# Patient Record
Sex: FEMALE | Race: ASIAN
[De-identification: names, ages, dates, MRNs, and addresses within clinical notes are randomized per-mention and may not be internally consistent; named-entity substitution may affect disease eponyms.]

---

## 2022-05-10 PROBLEM — Z00.00 ENCOUNTER FOR PREVENTIVE HEALTH EXAMINATION: Status: ACTIVE | Noted: 2022-05-10

## 2022-06-13 ENCOUNTER — ASOB RESULT (OUTPATIENT)
Age: 27
End: 2022-06-13

## 2022-06-13 ENCOUNTER — APPOINTMENT (OUTPATIENT)
Dept: ANTEPARTUM | Facility: CLINIC | Age: 27
End: 2022-06-13
Payer: COMMERCIAL

## 2022-06-13 PROCEDURE — 76805 OB US >/= 14 WKS SNGL FETUS: CPT

## 2022-07-07 ENCOUNTER — ASOB RESULT (OUTPATIENT)
Age: 27
End: 2022-07-07

## 2022-07-07 ENCOUNTER — APPOINTMENT (OUTPATIENT)
Dept: ANTEPARTUM | Facility: CLINIC | Age: 27
End: 2022-07-07

## 2022-07-07 PROCEDURE — 76815 OB US LIMITED FETUS(S): CPT

## 2022-09-01 ENCOUNTER — ASOB RESULT (OUTPATIENT)
Age: 27
End: 2022-09-01

## 2022-09-01 ENCOUNTER — APPOINTMENT (OUTPATIENT)
Dept: ANTEPARTUM | Facility: CLINIC | Age: 27
End: 2022-09-01

## 2022-09-01 PROCEDURE — 76816 OB US FOLLOW-UP PER FETUS: CPT

## 2022-09-01 PROCEDURE — 76819 FETAL BIOPHYS PROFIL W/O NST: CPT

## 2022-10-18 ENCOUNTER — OUTPATIENT (OUTPATIENT)
Dept: OUTPATIENT SERVICES | Facility: HOSPITAL | Age: 27
LOS: 1 days | End: 2022-10-18
Payer: COMMERCIAL

## 2022-10-18 DIAGNOSIS — O34.211 MATERNAL CARE FOR LOW TRANSVERSE SCAR FROM PREVIOUS CESAREAN DELIVERY: ICD-10-CM

## 2022-10-18 LAB
BASOPHILS # BLD AUTO: 0 K/UL — SIGNIFICANT CHANGE UP (ref 0–0.2)
BASOPHILS NFR BLD AUTO: 0 % — SIGNIFICANT CHANGE UP (ref 0–2)
EOSINOPHIL # BLD AUTO: 0 K/UL — SIGNIFICANT CHANGE UP (ref 0–0.5)
EOSINOPHIL NFR BLD AUTO: 0 % — SIGNIFICANT CHANGE UP (ref 0–6)
HCT VFR BLD CALC: 32.8 % — LOW (ref 34.5–45)
HGB BLD-MCNC: 10.4 G/DL — LOW (ref 11.5–15.5)
LYMPHOCYTES # BLD AUTO: 2.79 K/UL — SIGNIFICANT CHANGE UP (ref 1–3.3)
LYMPHOCYTES # BLD AUTO: 23 % — SIGNIFICANT CHANGE UP (ref 13–44)
MCHC RBC-ENTMCNC: 26.9 PG — LOW (ref 27–34)
MCHC RBC-ENTMCNC: 31.7 GM/DL — LOW (ref 32–36)
MCV RBC AUTO: 84.8 FL — SIGNIFICANT CHANGE UP (ref 80–100)
MONOCYTES # BLD AUTO: 1.58 K/UL — HIGH (ref 0–0.9)
MONOCYTES NFR BLD AUTO: 13 % — SIGNIFICANT CHANGE UP (ref 2–14)
NEUTROPHILS # BLD AUTO: 7.16 K/UL — SIGNIFICANT CHANGE UP (ref 1.8–7.4)
NEUTROPHILS NFR BLD AUTO: 59 % — SIGNIFICANT CHANGE UP (ref 43–77)
NRBC # BLD: SIGNIFICANT CHANGE UP /100 WBCS (ref 0–0)
PLATELET # BLD AUTO: 342 K/UL — SIGNIFICANT CHANGE UP (ref 150–400)
RBC # BLD: 3.87 M/UL — SIGNIFICANT CHANGE UP (ref 3.8–5.2)
RBC # FLD: 15.7 % — HIGH (ref 10.3–14.5)
SARS-COV-2 RNA SPEC QL NAA+PROBE: SIGNIFICANT CHANGE UP
WBC # BLD: 12.13 K/UL — HIGH (ref 3.8–10.5)
WBC # FLD AUTO: 12.13 K/UL — HIGH (ref 3.8–10.5)

## 2022-10-18 PROCEDURE — U0005: CPT

## 2022-10-18 PROCEDURE — 85025 COMPLETE CBC W/AUTO DIFF WBC: CPT

## 2022-10-18 PROCEDURE — 36415 COLL VENOUS BLD VENIPUNCTURE: CPT

## 2022-10-18 PROCEDURE — 86850 RBC ANTIBODY SCREEN: CPT

## 2022-10-18 PROCEDURE — 86901 BLOOD TYPING SEROLOGIC RH(D): CPT

## 2022-10-18 PROCEDURE — U0003: CPT

## 2022-10-18 PROCEDURE — 86900 BLOOD TYPING SEROLOGIC ABO: CPT

## 2022-10-19 DIAGNOSIS — O34.211 MATERNAL CARE FOR LOW TRANSVERSE SCAR FROM PREVIOUS CESAREAN DELIVERY: ICD-10-CM

## 2022-10-20 ENCOUNTER — INPATIENT (INPATIENT)
Facility: HOSPITAL | Age: 27
LOS: 2 days | Discharge: HOME | DRG: 540 | End: 2022-10-23
Attending: OBSTETRICS & GYNECOLOGY | Admitting: OBSTETRICS & GYNECOLOGY
Payer: COMMERCIAL

## 2022-10-20 VITALS — HEIGHT: 63 IN | WEIGHT: 173.06 LBS

## 2022-10-20 DIAGNOSIS — O34.211 MATERNAL CARE FOR LOW TRANSVERSE SCAR FROM PREVIOUS CESAREAN DELIVERY: ICD-10-CM

## 2022-10-20 LAB
COVID-19 SPIKE DOMAIN AB INTERP: POSITIVE
COVID-19 SPIKE DOMAIN ANTIBODY RESULT: 216 U/ML — HIGH
SARS-COV-2 IGG+IGM SERPL QL IA: 216 U/ML — HIGH
SARS-COV-2 IGG+IGM SERPL QL IA: POSITIVE

## 2022-10-20 PROCEDURE — 94760 N-INVAS EAR/PLS OXIMETRY 1: CPT

## 2022-10-20 PROCEDURE — 86780 TREPONEMA PALLIDUM: CPT

## 2022-10-20 PROCEDURE — 85025 COMPLETE CBC W/AUTO DIFF WBC: CPT

## 2022-10-20 PROCEDURE — 86769 SARS-COV-2 COVID-19 ANTIBODY: CPT

## 2022-10-20 PROCEDURE — 59050 FETAL MONITOR W/REPORT: CPT

## 2022-10-20 PROCEDURE — 90707 MMR VACCINE SC: CPT

## 2022-10-20 PROCEDURE — 36415 COLL VENOUS BLD VENIPUNCTURE: CPT

## 2022-10-20 RX ORDER — HYDROMORPHONE HYDROCHLORIDE 2 MG/ML
0.5 INJECTION INTRAMUSCULAR; INTRAVENOUS; SUBCUTANEOUS
Refills: 0 | Status: DISCONTINUED | OUTPATIENT
Start: 2022-10-20 | End: 2022-10-20

## 2022-10-20 RX ORDER — TETANUS TOXOID, REDUCED DIPHTHERIA TOXOID AND ACELLULAR PERTUSSIS VACCINE, ADSORBED 5; 2.5; 8; 8; 2.5 [IU]/.5ML; [IU]/.5ML; UG/.5ML; UG/.5ML; UG/.5ML
0.5 SUSPENSION INTRAMUSCULAR ONCE
Refills: 0 | Status: DISCONTINUED | OUTPATIENT
Start: 2022-10-20 | End: 2022-10-23

## 2022-10-20 RX ORDER — IBUPROFEN 200 MG
600 TABLET ORAL EVERY 6 HOURS
Refills: 0 | Status: COMPLETED | OUTPATIENT
Start: 2022-10-20 | End: 2023-09-18

## 2022-10-20 RX ORDER — SODIUM CHLORIDE 9 MG/ML
1000 INJECTION, SOLUTION INTRAVENOUS
Refills: 0 | Status: DISCONTINUED | OUTPATIENT
Start: 2022-10-20 | End: 2022-10-20

## 2022-10-20 RX ORDER — SODIUM CHLORIDE 9 MG/ML
1000 INJECTION, SOLUTION INTRAVENOUS ONCE
Refills: 0 | Status: COMPLETED | OUTPATIENT
Start: 2022-10-20 | End: 2022-10-20

## 2022-10-20 RX ORDER — ACETAMINOPHEN 500 MG
975 TABLET ORAL
Refills: 0 | Status: DISCONTINUED | OUTPATIENT
Start: 2022-10-20 | End: 2022-10-23

## 2022-10-20 RX ORDER — OXYCODONE HYDROCHLORIDE 5 MG/1
5 TABLET ORAL ONCE
Refills: 0 | Status: DISCONTINUED | OUTPATIENT
Start: 2022-10-20 | End: 2022-10-23

## 2022-10-20 RX ORDER — ACETAMINOPHEN 500 MG
1000 TABLET ORAL ONCE
Refills: 0 | Status: COMPLETED | OUTPATIENT
Start: 2022-10-20 | End: 2022-10-21

## 2022-10-20 RX ORDER — OXYCODONE HYDROCHLORIDE 5 MG/1
5 TABLET ORAL
Refills: 0 | Status: DISCONTINUED | OUTPATIENT
Start: 2022-10-20 | End: 2022-10-23

## 2022-10-20 RX ORDER — LANOLIN
1 OINTMENT (GRAM) TOPICAL EVERY 6 HOURS
Refills: 0 | Status: DISCONTINUED | OUTPATIENT
Start: 2022-10-20 | End: 2022-10-23

## 2022-10-20 RX ORDER — MAGNESIUM HYDROXIDE 400 MG/1
30 TABLET, CHEWABLE ORAL
Refills: 0 | Status: DISCONTINUED | OUTPATIENT
Start: 2022-10-20 | End: 2022-10-23

## 2022-10-20 RX ORDER — ONDANSETRON 8 MG/1
4 TABLET, FILM COATED ORAL ONCE
Refills: 0 | Status: COMPLETED | OUTPATIENT
Start: 2022-10-20 | End: 2022-10-20

## 2022-10-20 RX ORDER — ONDANSETRON 8 MG/1
4 TABLET, FILM COATED ORAL EVERY 6 HOURS
Refills: 0 | Status: DISCONTINUED | OUTPATIENT
Start: 2022-10-20 | End: 2022-10-23

## 2022-10-20 RX ORDER — OXYTOCIN 10 UNIT/ML
333.33 VIAL (ML) INJECTION
Qty: 20 | Refills: 0 | Status: DISCONTINUED | OUTPATIENT
Start: 2022-10-20 | End: 2022-10-23

## 2022-10-20 RX ORDER — SODIUM CHLORIDE 9 MG/ML
1000 INJECTION, SOLUTION INTRAVENOUS
Refills: 0 | Status: DISCONTINUED | OUTPATIENT
Start: 2022-10-20 | End: 2022-10-23

## 2022-10-20 RX ORDER — ENOXAPARIN SODIUM 100 MG/ML
40 INJECTION SUBCUTANEOUS EVERY 24 HOURS
Refills: 0 | Status: DISCONTINUED | OUTPATIENT
Start: 2022-10-21 | End: 2022-10-23

## 2022-10-20 RX ORDER — FAMOTIDINE 10 MG/ML
20 INJECTION INTRAVENOUS ONCE
Refills: 0 | Status: COMPLETED | OUTPATIENT
Start: 2022-10-20 | End: 2022-10-20

## 2022-10-20 RX ORDER — CITRIC ACID/SODIUM CITRATE 300-500 MG
30 SOLUTION, ORAL ORAL ONCE
Refills: 0 | Status: COMPLETED | OUTPATIENT
Start: 2022-10-20 | End: 2022-10-20

## 2022-10-20 RX ORDER — OXYTOCIN 10 UNIT/ML
333.33 VIAL (ML) INJECTION
Qty: 20 | Refills: 0 | Status: DISCONTINUED | OUTPATIENT
Start: 2022-10-20 | End: 2022-10-20

## 2022-10-20 RX ORDER — OXYCODONE HYDROCHLORIDE 5 MG/1
10 TABLET ORAL
Refills: 0 | Status: DISCONTINUED | OUTPATIENT
Start: 2022-10-20 | End: 2022-10-23

## 2022-10-20 RX ORDER — DIPHENHYDRAMINE HCL 50 MG
25 CAPSULE ORAL EVERY 6 HOURS
Refills: 0 | Status: DISCONTINUED | OUTPATIENT
Start: 2022-10-20 | End: 2022-10-23

## 2022-10-20 RX ORDER — KETOROLAC TROMETHAMINE 30 MG/ML
30 SYRINGE (ML) INJECTION EVERY 6 HOURS
Refills: 0 | Status: DISCONTINUED | OUTPATIENT
Start: 2022-10-20 | End: 2022-10-21

## 2022-10-20 RX ORDER — NALOXONE HYDROCHLORIDE 4 MG/.1ML
0.1 SPRAY NASAL
Refills: 0 | Status: DISCONTINUED | OUTPATIENT
Start: 2022-10-20 | End: 2022-10-23

## 2022-10-20 RX ORDER — SIMETHICONE 80 MG/1
80 TABLET, CHEWABLE ORAL EVERY 4 HOURS
Refills: 0 | Status: DISCONTINUED | OUTPATIENT
Start: 2022-10-20 | End: 2022-10-23

## 2022-10-20 RX ORDER — MORPHINE SULFATE 50 MG/1
0.1 CAPSULE, EXTENDED RELEASE ORAL ONCE
Refills: 0 | Status: DISCONTINUED | OUTPATIENT
Start: 2022-10-20 | End: 2022-10-23

## 2022-10-20 RX ADMIN — Medication 30 MILLIGRAM(S): at 17:05

## 2022-10-20 RX ADMIN — SODIUM CHLORIDE 2000 MILLILITER(S): 9 INJECTION, SOLUTION INTRAVENOUS at 14:17

## 2022-10-20 RX ADMIN — ONDANSETRON 4 MILLIGRAM(S): 8 TABLET, FILM COATED ORAL at 17:07

## 2022-10-20 RX ADMIN — Medication 30 MILLIGRAM(S): at 23:51

## 2022-10-20 RX ADMIN — FAMOTIDINE 20 MILLIGRAM(S): 10 INJECTION INTRAVENOUS at 14:17

## 2022-10-20 RX ADMIN — Medication 30 MILLILITER(S): at 14:17

## 2022-10-20 RX ADMIN — Medication 1000 MILLIUNIT(S)/MIN: at 17:45

## 2022-10-20 RX ADMIN — Medication 30 MILLIGRAM(S): at 16:57

## 2022-10-20 NOTE — PATIENT PROFILE OB - REASON FOR REFUSAL (REFER PATIENT TO HEALTHCARE PROVIDER FOR FOLLOW-UP):
Patient hasn't had a period in 2 months.. Home pregnancy tests were negative. Please call back.   refused

## 2022-10-20 NOTE — PATIENT PROFILE OB - POST PARTUM DEPRESSION SCREEN OB 5
[FreeTextEntry1] : Imaging studies and consultations from other specialists were reviewed today. No genetic testing or biochemical testing were available for review today.  no

## 2022-10-20 NOTE — PATIENT PROFILE OB - FALL HARM RISK - UNIVERSAL INTERVENTIONS
Bed in lowest position, wheels locked, appropriate side rails in place/Call bell, personal items and telephone in reach/Instruct patient to call for assistance before getting out of bed or chair/Non-slip footwear when patient is out of bed/[place] to call system/Physically safe environment - no spills, clutter or unnecessary equipment/Purposeful Proactive Rounding/Room/bathroom lighting operational, light cord in reach

## 2022-10-21 ENCOUNTER — TRANSCRIPTION ENCOUNTER (OUTPATIENT)
Age: 27
End: 2022-10-21

## 2022-10-21 LAB
BASOPHILS # BLD AUTO: 0.02 K/UL — SIGNIFICANT CHANGE UP (ref 0–0.2)
BASOPHILS NFR BLD AUTO: 0.2 % — SIGNIFICANT CHANGE UP (ref 0–2)
EOSINOPHIL # BLD AUTO: 0.03 K/UL — SIGNIFICANT CHANGE UP (ref 0–0.5)
EOSINOPHIL NFR BLD AUTO: 0.2 % — SIGNIFICANT CHANGE UP (ref 0–6)
HCT VFR BLD CALC: 25.5 % — LOW (ref 34.5–45)
HGB BLD-MCNC: 8.1 G/DL — LOW (ref 11.5–15.5)
IMM GRANULOCYTES NFR BLD AUTO: 1.1 % — HIGH (ref 0–0.9)
LYMPHOCYTES # BLD AUTO: 17.3 % — SIGNIFICANT CHANGE UP (ref 13–44)
LYMPHOCYTES # BLD AUTO: 2.28 K/UL — SIGNIFICANT CHANGE UP (ref 1–3.3)
MCHC RBC-ENTMCNC: 26.7 PG — LOW (ref 27–34)
MCHC RBC-ENTMCNC: 31.8 GM/DL — LOW (ref 32–36)
MCV RBC AUTO: 84.2 FL — SIGNIFICANT CHANGE UP (ref 80–100)
MONOCYTES # BLD AUTO: 1.07 K/UL — HIGH (ref 0–0.9)
MONOCYTES NFR BLD AUTO: 8.1 % — SIGNIFICANT CHANGE UP (ref 2–14)
NEUTROPHILS # BLD AUTO: 9.61 K/UL — HIGH (ref 1.8–7.4)
NEUTROPHILS NFR BLD AUTO: 73.1 % — SIGNIFICANT CHANGE UP (ref 43–77)
PLATELET # BLD AUTO: 298 K/UL — SIGNIFICANT CHANGE UP (ref 150–400)
RBC # BLD: 3.03 M/UL — LOW (ref 3.8–5.2)
RBC # FLD: 16 % — HIGH (ref 10.3–14.5)
T PALLIDUM AB TITR SER: NEGATIVE — SIGNIFICANT CHANGE UP
WBC # BLD: 13.16 K/UL — HIGH (ref 3.8–10.5)
WBC # FLD AUTO: 13.16 K/UL — HIGH (ref 3.8–10.5)

## 2022-10-21 RX ORDER — IBUPROFEN 200 MG
600 TABLET ORAL EVERY 6 HOURS
Refills: 0 | Status: DISCONTINUED | OUTPATIENT
Start: 2022-10-21 | End: 2022-10-23

## 2022-10-21 RX ADMIN — Medication 975 MILLIGRAM(S): at 03:51

## 2022-10-21 RX ADMIN — Medication 600 MILLIGRAM(S): at 18:40

## 2022-10-21 RX ADMIN — Medication 975 MILLIGRAM(S): at 10:20

## 2022-10-21 RX ADMIN — Medication 30 MILLIGRAM(S): at 12:10

## 2022-10-21 RX ADMIN — Medication 30 MILLIGRAM(S): at 06:07

## 2022-10-21 RX ADMIN — Medication 1000 MILLIGRAM(S): at 18:07

## 2022-10-21 RX ADMIN — Medication 975 MILLIGRAM(S): at 21:15

## 2022-10-21 RX ADMIN — Medication 400 MILLIGRAM(S): at 15:01

## 2022-10-21 RX ADMIN — Medication 600 MILLIGRAM(S): at 23:27

## 2022-10-21 RX ADMIN — ENOXAPARIN SODIUM 40 MILLIGRAM(S): 100 INJECTION SUBCUTANEOUS at 06:06

## 2022-10-21 RX ADMIN — Medication 30 MILLIGRAM(S): at 13:23

## 2022-10-21 RX ADMIN — Medication 975 MILLIGRAM(S): at 09:32

## 2022-10-22 RX ORDER — IBUPROFEN 200 MG
1 TABLET ORAL
Qty: 56 | Refills: 0
Start: 2022-10-22 | End: 2022-11-04

## 2022-10-22 RX ORDER — ACETAMINOPHEN 500 MG
2 TABLET ORAL
Qty: 56 | Refills: 0
Start: 2022-10-22 | End: 2022-10-28

## 2022-10-22 RX ADMIN — Medication 975 MILLIGRAM(S): at 22:30

## 2022-10-22 RX ADMIN — Medication 975 MILLIGRAM(S): at 21:32

## 2022-10-22 RX ADMIN — Medication 600 MILLIGRAM(S): at 12:10

## 2022-10-22 RX ADMIN — Medication 975 MILLIGRAM(S): at 10:30

## 2022-10-22 RX ADMIN — Medication 600 MILLIGRAM(S): at 05:42

## 2022-10-22 RX ADMIN — Medication 975 MILLIGRAM(S): at 09:41

## 2022-10-22 RX ADMIN — ENOXAPARIN SODIUM 40 MILLIGRAM(S): 100 INJECTION SUBCUTANEOUS at 06:35

## 2022-10-22 RX ADMIN — Medication 600 MILLIGRAM(S): at 13:00

## 2022-10-22 RX ADMIN — Medication 975 MILLIGRAM(S): at 03:08

## 2022-10-22 RX ADMIN — Medication 600 MILLIGRAM(S): at 18:45

## 2022-10-22 NOTE — DISCHARGE NOTE OB - SWOLLEN, PAINFUL HOT AREAS AND/OR STREAKS ON THE BREAST
Patient here for left wrist pain that is not any better pain level today is a 05/10. Medication Reconciliation: complete.    Brigette Garnica LPN  1/25/2021 11:30 AM  
Statement Selected

## 2022-10-22 NOTE — DISCHARGE NOTE OB - CARE PROVIDER_API CALL
Yenni Hairston)  Obstetrics and Gynecology  [address], [address]  [place] [address]  Phone: [phone]  Fax: [phone]  Follow Up Time:

## 2022-10-22 NOTE — PROGRESS NOTE ADULT - SUBJECTIVE AND OBJECTIVE BOX
pt is s/p pltcs     she is doing well    she has asymptomatic iron def anemia   on exam vss    breast nml b/l    abd is soft and ut is firm    incision is dry clean and intact   no calf tenderness b/l   a/p   s/p PLTCS   doing well    routine post op care

## 2022-10-22 NOTE — DISCHARGE NOTE OB - NS OB DC PLAN IMMU MMR DATE
See [facility] report. Remote monitoring reviewed over a 90 day period. End of 90 day monitoring period date of service 8. 7.2019. 10/23/22

## 2022-10-22 NOTE — DISCHARGE NOTE OB - PATIENT PORTAL LINK FT
You can access the FollowMyHealth Patient Portal offered by [facility] by registering at the following website: [email or web address]. By joining [facility]’s FollowMyHealth portal, you will also be able to view your health information using other applications (apps) compatible with our system.

## 2022-10-22 NOTE — DISCHARGE NOTE OB - NS MD DC FALL RISK RISK
For information on Fall & Injury Prevention, visit: [email or web address] OR  [email or web address] OR  https://www.cdc.gov/steadi/patient.html

## 2022-10-22 NOTE — DISCHARGE NOTE OB - HOSPITAL COURSE
s/p uncomplicated CS on 10/20/22. Patient transferred to post partum unit, uncomplicated hospital course. At the time of discharge patient was tolerating regular diet PO, ambulating, voiding, and demonstrating bowel function. Pain well controlled with pain medications PRN.

## 2022-10-22 NOTE — DISCHARGE NOTE OB - CARE PLAN
1 Principal Discharge DX:	 delivery delivered  Assessment and plan of treatment:	1) Please take ibuprofen, tylenol and other prescribed medications as needed for pain.  2) Nothing in the vagina for 6 weeks (including no sex, no tampons, and no douching).  3) No tub baths or pools for 6 weeks. Showers are okay. Please keep your incision dry until your follow up appointment.  4) Please call your doctor for a follow up appointment  5) Please call the office sooner if you have heavy vaginal bleeding, severe abdominal pain, or fever over 100.4F.

## 2022-10-23 VITALS
OXYGEN SATURATION: 99 % | TEMPERATURE: 98 F | DIASTOLIC BLOOD PRESSURE: 72 MMHG | RESPIRATION RATE: 18 BRPM | SYSTOLIC BLOOD PRESSURE: 118 MMHG | HEART RATE: 101 BPM

## 2022-10-23 RX ADMIN — Medication 975 MILLIGRAM(S): at 08:51

## 2022-10-23 RX ADMIN — Medication 975 MILLIGRAM(S): at 09:45

## 2022-10-23 RX ADMIN — Medication 0.5 MILLILITER(S): at 13:28

## 2022-10-23 RX ADMIN — Medication 600 MILLIGRAM(S): at 07:25

## 2022-10-23 RX ADMIN — Medication 600 MILLIGRAM(S): at 11:52

## 2022-10-23 RX ADMIN — Medication 975 MILLIGRAM(S): at 03:20

## 2022-10-23 RX ADMIN — Medication 600 MILLIGRAM(S): at 01:25

## 2022-10-23 RX ADMIN — Medication 600 MILLIGRAM(S): at 00:28

## 2022-10-23 RX ADMIN — ENOXAPARIN SODIUM 40 MILLIGRAM(S): 100 INJECTION SUBCUTANEOUS at 06:28

## 2022-10-23 RX ADMIN — Medication 975 MILLIGRAM(S): at 04:15

## 2022-10-23 RX ADMIN — Medication 600 MILLIGRAM(S): at 06:28

## 2022-10-23 NOTE — PROGRESS NOTE ADULT - ATTENDING COMMENTS
26y  s/p pCS now POD 3 of a viable male infant.     SUBJECTIVE:  No acute events overnight, patient has no complaints. Pain is well controlled with PRN pain medication. She is ambulating, voiding, tolerating PO. Denies N/V  passing flatus, no BM. Patient is having normal lochia.       Patient doing well and ready for d/c home    I agree with resident assessment and plan    Dr Kay

## 2022-10-23 NOTE — PROGRESS NOTE ADULT - SUBJECTIVE AND OBJECTIVE BOX
Name: ANNIKA BETANCOURT  MRN: [id number]  Date Admitted: 10-20-22  Location: [facility] [place] 233 01 ([facility] [place])  Attending: Yenni Hairston Afshan    All: Allergy Status Unknown    Post Partum:  Delivery Progress Note    ANNIKA BETANCOURT is a 26y  s/p pCS now PPD 3 of a viable male infant.     SUBJECTIVE:  No acute events overnight, patient has no complaints. Pain is well controlled with PRN pain medication. She is ambulating, voiding, tolerating PO. Denies N/V  passing flatus, no BM. Patient is having normal lochia.     OBJECTIVE:  Physical exam:  General: AOx3, NAD.  Heart: RRR. S1S2.  Lungs: CTABL. Good airflow bilaterally.   Abdomen: +BS, Soft, appropriately tender to palpitation, firm uterine fundus at umbilicus. Incision is clean dry and intact.  Ext: No lower ext tenderness b/l, warm extremities.    Vital Signs Last 24 Hrs  T(C): 36.7 (23 Oct 2022 00:22), Max: 36.9 (22 Oct 2022 17:00)  T(F): 98 (23 Oct 2022 00:22), Max: 98.4 (22 Oct 2022 17:00)  HR: 89 (23 Oct 2022 00:22) (87 - 89)  BP: 103/67 (23 Oct 2022 00:22) (103/67 - 117/57)  BP(mean): --  RR: 18 (23 Oct 2022 00:22) (18 - 18)  SpO2: 100% (23 Oct 2022 00:22) (99% - 100%)    Parameters below as of 23 Oct 2022 00:22  Patient On (Oxygen Delivery Method): room air        LABS:                        8.1    13.16 )-----------( 298      ( 21 Oct 2022 08:37 )             25.5       A/P: Patient is a 26y  s/p pCS now PPD 3  - doing well postpartum  - labs Stable, vitals stable  - Voiding, tolerating PO, passing gas but no bm yet. monitor bowel function  - Advance care as tolerated  - Continue routine postpartum care and education  - Anticipate discharge today PPD#3   Name: ANNIKA BETANCOURT  MRN: [id number]  Date Admitted: 10-20-22  Location: [facility] [place] 233 01 ([facility] [place])  Attending: Yenni Hairston Afshan    All: Allergy Status Unknown    Post Partum:  Delivery Progress Note    ANNIKA BETANCOURT is a 26y  s/p pCS now POD 3 of a viable male infant.     SUBJECTIVE:  No acute events overnight, patient has no complaints. Pain is well controlled with PRN pain medication. She is ambulating, voiding, tolerating PO. Denies N/V  passing flatus, no BM. Patient is having normal lochia.     OBJECTIVE:  Physical exam:  General: AOx3, NAD.  Heart: RRR. S1S2.  Lungs: CTABL. Good airflow bilaterally.   Abdomen: +BS, Soft, appropriately tender to palpitation, firm uterine fundus at umbilicus. Incision is clean dry and intact.  Ext: No lower ext tenderness b/l, warm extremities.    Vital Signs Last 24 Hrs  T(C): 36.7 (23 Oct 2022 00:22), Max: 36.9 (22 Oct 2022 17:00)  T(F): 98 (23 Oct 2022 00:22), Max: 98.4 (22 Oct 2022 17:00)  HR: 89 (23 Oct 2022 00:22) (87 - 89)  BP: 103/67 (23 Oct 2022 00:22) (103/67 - 117/57)  BP(mean): --  RR: 18 (23 Oct 2022 00:22) (18 - 18)  SpO2: 100% (23 Oct 2022 00:22) (99% - 100%)    Parameters below as of 23 Oct 2022 00:22  Patient On (Oxygen Delivery Method): room air        LABS:                        8.1    13.16 )-----------( 298      ( 21 Oct 2022 08:37 )             25.5       A/P: Patient is a 26y  s/p pCS now POD 3  - doing well postpartum  - labs Stable, vitals stable  - Voiding, tolerating PO, passing gas but no bm yet. monitor bowel function  - Advance care as tolerated  - Continue routine postpartum care and education  - Anticipate discharge today POD#3

## 2022-10-26 DIAGNOSIS — Z28.21 IMMUNIZATION NOT CARRIED OUT BECAUSE OF PATIENT REFUSAL: ICD-10-CM

## 2022-10-26 DIAGNOSIS — D50.9 IRON DEFICIENCY ANEMIA, UNSPECIFIED: ICD-10-CM

## 2022-10-26 DIAGNOSIS — Z34.03 ENCOUNTER FOR SUPERVISION OF NORMAL FIRST PREGNANCY, THIRD TRIMESTER: ICD-10-CM

## 2022-10-26 DIAGNOSIS — Z79.1 LONG TERM (CURRENT) USE OF NON-STEROIDAL ANTI-INFLAMMATORIES (NSAID): ICD-10-CM

## 2022-10-26 DIAGNOSIS — Z3A.39 39 WEEKS GESTATION OF PREGNANCY: ICD-10-CM

## 2022-10-26 DIAGNOSIS — F41.9 ANXIETY DISORDER, UNSPECIFIED: ICD-10-CM
